# Patient Record
(demographics unavailable — no encounter records)

---

## 2025-07-22 NOTE — PROCEDURE
[F/U Abnormal US] : f/u abnormal ultrasound [Saline Infusion Sonography] : saline infusion sonography

## 2025-07-22 NOTE — HISTORY OF PRESENT ILLNESS
[FreeTextEntry1] :  07/22/2025. LORENA GLYNN 56 year year old female presents for a hysterogram due to thickened EL on TVUS.   06/13/25 Pelvic sono- EL 4.96mm

## 2025-07-22 NOTE — PHYSICAL EXAM
[Chaperoned Physical Exam] : A chaperone was present in the examining room during all aspects of the physical examination. [Appropriately responsive] : appropriately responsive [Alert] : alert [No Acute Distress] : no acute distress [Oriented x3] : oriented x3 [Vulvar Atrophy] : vulvar atrophy [Labia Majora] : normal [Labia Minora] : normal [Atrophy] : atrophy [Normal] : normal [Uterine Adnexae] : normal [FreeTextEntry2] :  Nina murillo)

## 2025-07-22 NOTE — PLAN
[FreeTextEntry1] : Sonohysterogram:  UCG negative Pt tolerated well- discussed risk of bleeding or infection Findings: polyp Plan: Operative Hysteroscopy Removal of Polyp and D&C- Procedure briefly described.  Call MD if heavy bleeding, fever or chills.  Candelario make telemed for Pre op consult RTO PRN.  Nina BIANCHI, am scribing for and in the presence of VIN Brewer M.D. in the following sections: HISTORY OF PRESENT ILLNESS, PAST MEDICAL/FAMILY/SOCIAL HISTORY, REVIEW OF SYSTEMS, PHYSICAL EXAM, ASSESSMENT/PLAN.